# Patient Record
Sex: MALE | Race: WHITE | URBAN - METROPOLITAN AREA
[De-identification: names, ages, dates, MRNs, and addresses within clinical notes are randomized per-mention and may not be internally consistent; named-entity substitution may affect disease eponyms.]

---
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
## 2020-01-31 ENCOUNTER — TELEPHONE (OUTPATIENT)
Dept: FAMILY MEDICINE | Facility: CLINIC | Age: 20
End: 2020-01-31

## 2020-01-31 ASSESSMENT — PATIENT HEALTH QUESTIONNAIRE - PHQ9: SUM OF ALL RESPONSES TO PHQ QUESTIONS 1-9: 11

## 2020-01-31 NOTE — TELEPHONE ENCOUNTER
Patient Quality Outreach Summary      Summary:    Patient is due/failing the following:   PHQ-9 Needed, Adult/Adolescent physical, date due: overdue since 8/18/2015 and Immunizations    12 month remission PHQ-9 follow up date range: 11/5-3/4    Type of outreach:    Phone, spoke to patient. completed PHQ-9, declined appointment    PHQ-9 completed with score of 11. Positive response to question #9     Remission is defined as a follow-up PHQ-9 score less than 5. Route to provider for review if score is 5 or above.    Chart routed to: Provider high priority  (Dr. Drew is the assigned provider)    Snehal Bergman,

## 2020-02-03 NOTE — TELEPHONE ENCOUNTER
Please call patient and see if he sees psych and have him follow up with a provider  Love Drew D.O.

## 2020-02-05 NOTE — TELEPHONE ENCOUNTER
Reached out to patient.  He reports doing fine mentally, denies any SI, etc. He states he is not taking any medications, has not seen his therapist for about a year. RN recommended annual visit with provider, or at least f/u for mood, etc. He declines appt at this time, states he will call back to schedule.  No further questions/concerns at this time, so will close encounter.    Mary Mcdowell RN